# Patient Record
Sex: FEMALE | Race: WHITE | Employment: FULL TIME | URBAN - METROPOLITAN AREA
[De-identification: names, ages, dates, MRNs, and addresses within clinical notes are randomized per-mention and may not be internally consistent; named-entity substitution may affect disease eponyms.]

---

## 2017-05-09 ENCOUNTER — OFFICE VISIT (OUTPATIENT)
Dept: OBGYN CLINIC | Facility: CLINIC | Age: 27
End: 2017-05-09

## 2017-05-09 VITALS
SYSTOLIC BLOOD PRESSURE: 130 MMHG | DIASTOLIC BLOOD PRESSURE: 80 MMHG | HEIGHT: 67.5 IN | WEIGHT: 143.81 LBS | BODY MASS INDEX: 22.31 KG/M2

## 2017-05-09 DIAGNOSIS — Z32.02 PREGNANCY EXAMINATION OR TEST, NEGATIVE RESULT: ICD-10-CM

## 2017-05-09 DIAGNOSIS — Z30.430 ENCOUNTER FOR IUD INSERTION: Primary | ICD-10-CM

## 2017-05-09 PROCEDURE — 81025 URINE PREGNANCY TEST: CPT | Performed by: OBSTETRICS & GYNECOLOGY

## 2017-05-09 PROCEDURE — 58300 INSERT INTRAUTERINE DEVICE: CPT | Performed by: OBSTETRICS & GYNECOLOGY

## 2017-05-09 NOTE — PROGRESS NOTES
GYN H&P     5/9/2017  1:50 PM    CC:Patient presents for IUD placement    HPI: Patient is a 31year old Christophe Buttner Patient's last menstrual period was 04/16/2017 (exact date). who presents for IUD placement.     Menses: 1 x pr month, no heavy bleeding or sever Consent signed. Procedure discussed with the patient in detail including indication, risks, benefits, alternatives and complications. I discussed with the patient the procedure.   I discussed the normal SE of break through vaginal bleeding which could

## 2018-06-01 ENCOUNTER — OFFICE VISIT (OUTPATIENT)
Dept: OTOLARYNGOLOGY | Facility: CLINIC | Age: 28
End: 2018-06-01

## 2018-06-01 VITALS
TEMPERATURE: 98 F | BODY MASS INDEX: 21.98 KG/M2 | DIASTOLIC BLOOD PRESSURE: 81 MMHG | HEIGHT: 68 IN | WEIGHT: 145 LBS | SYSTOLIC BLOOD PRESSURE: 129 MMHG

## 2018-06-01 DIAGNOSIS — S02.2XXA CLOSED FRACTURE OF NASAL BONE, INITIAL ENCOUNTER: Primary | ICD-10-CM

## 2018-06-01 PROCEDURE — 99212 OFFICE O/P EST SF 10 MIN: CPT | Performed by: OTOLARYNGOLOGY

## 2018-06-01 PROCEDURE — 99203 OFFICE O/P NEW LOW 30 MIN: CPT | Performed by: OTOLARYNGOLOGY

## 2018-06-01 RX ORDER — CETIRIZINE HYDROCHLORIDE 10 MG/1
10 TABLET ORAL DAILY
COMMUNITY

## 2018-06-01 RX ORDER — EPINEPHRINE 0.3 MG/.3ML
INJECTION INTRAMUSCULAR
COMMUNITY
Start: 2018-05-16

## 2018-06-01 NOTE — PROGRESS NOTES
Edvin Horton is a 33year old female.   Patient presents with:  Nose Problem: pt bumped nose on Saturday and would like to check if nose is broken, history of broken nose       HISTORY OF PRESENT ILLNESS  Presents with significant history of previous nasal Surgical History:  05/2011: ANESTH,NOSE,SINUS SURGERY  No date: HAND/FINGER SURGERY UNLISTED  No date: JAW SURGERY      Comment: bone issues  No date: OTHER SURGICAL HISTORY      Comment:  For factured nose x2      REVIEW OF SYSTEMS    System Neg/Pos Detail cervical. Posterior cervical. Supraclavicular. Nose/Mouth/Throat Normal External nose -multi deviation to the right. Soft tissue swelling of the nasal dorsum left greater than right. No palpable step-off or suspicious nasal fracture.   Lips/teeth/g

## 2018-08-07 ENCOUNTER — OFFICE VISIT (OUTPATIENT)
Dept: OBGYN CLINIC | Facility: CLINIC | Age: 28
End: 2018-08-07
Payer: COMMERCIAL

## 2018-08-07 VITALS
SYSTOLIC BLOOD PRESSURE: 142 MMHG | HEIGHT: 67.5 IN | BODY MASS INDEX: 24.98 KG/M2 | DIASTOLIC BLOOD PRESSURE: 80 MMHG | WEIGHT: 161 LBS

## 2018-08-07 DIAGNOSIS — Z01.419 ENCOUNTER FOR GYNECOLOGICAL EXAMINATION WITHOUT ABNORMAL FINDING: Primary | ICD-10-CM

## 2018-08-07 PROCEDURE — 99395 PREV VISIT EST AGE 18-39: CPT | Performed by: OBSTETRICS & GYNECOLOGY

## 2018-08-07 NOTE — PROGRESS NOTES
GYN H&P     2018  5:32 PM    CC: Patient is here for annual.     HPI: Patient is a 33year old  for annual. Minimal periods with IUD. No concern for STI's. Patient's last menstrual period was 2018.     OB History    Para Term adenopathy  BREASTS: symmetrical, nontender, no palpable masses or nodes, no nipple discharge, no skin changes, no dippling, no palpable axillary adenopathy  ABDOMEN: Soft, non distended; non tender, no masses. Liver and spleen non-tender, no enlargement.

## 2019-06-19 ENCOUNTER — TELEPHONE (OUTPATIENT)
Dept: OBGYN CLINIC | Facility: CLINIC | Age: 29
End: 2019-06-19

## 2019-08-15 ENCOUNTER — APPOINTMENT (OUTPATIENT)
Dept: LAB | Facility: REFERENCE LAB | Age: 29
End: 2019-08-15
Attending: FAMILY MEDICINE
Payer: COMMERCIAL

## 2019-08-15 ENCOUNTER — OFFICE VISIT (OUTPATIENT)
Dept: FAMILY MEDICINE CLINIC | Facility: CLINIC | Age: 29
End: 2019-08-15
Payer: COMMERCIAL

## 2019-08-15 VITALS
HEIGHT: 67.5 IN | BODY MASS INDEX: 25.87 KG/M2 | SYSTOLIC BLOOD PRESSURE: 129 MMHG | OXYGEN SATURATION: 98 % | HEART RATE: 95 BPM | DIASTOLIC BLOOD PRESSURE: 86 MMHG | WEIGHT: 166.81 LBS

## 2019-08-15 DIAGNOSIS — R21 PAPULAR RASH: ICD-10-CM

## 2019-08-15 DIAGNOSIS — D22.9 NEVUS: ICD-10-CM

## 2019-08-15 DIAGNOSIS — R59.0 LYMPHADENOPATHY OF RIGHT CERVICAL REGION: ICD-10-CM

## 2019-08-15 DIAGNOSIS — L03.316 CELLULITIS OF UMBILICUS: Primary | ICD-10-CM

## 2019-08-15 LAB
BASOPHILS # BLD AUTO: 0.06 X10(3) UL (ref 0–0.2)
BASOPHILS NFR BLD AUTO: 1.1 %
DEPRECATED RDW RBC AUTO: 41.2 FL (ref 35.1–46.3)
EOSINOPHIL # BLD AUTO: 0.12 X10(3) UL (ref 0–0.7)
EOSINOPHIL NFR BLD AUTO: 2.2 %
ERYTHROCYTE [DISTWIDTH] IN BLOOD BY AUTOMATED COUNT: 12.3 % (ref 11–15)
HCT VFR BLD AUTO: 43.2 % (ref 35–48)
HGB BLD-MCNC: 13.9 G/DL (ref 12–16)
IMM GRANULOCYTES # BLD AUTO: 0.01 X10(3) UL (ref 0–1)
IMM GRANULOCYTES NFR BLD: 0.2 %
LYMPHOCYTES # BLD AUTO: 1.43 X10(3) UL (ref 1–4)
LYMPHOCYTES NFR BLD AUTO: 26.7 %
MCH RBC QN AUTO: 29.2 PG (ref 26–34)
MCHC RBC AUTO-ENTMCNC: 32.2 G/DL (ref 31–37)
MCV RBC AUTO: 90.8 FL (ref 80–100)
MONOCYTES # BLD AUTO: 0.49 X10(3) UL (ref 0.1–1)
MONOCYTES NFR BLD AUTO: 9.1 %
NEUTROPHILS # BLD AUTO: 3.25 X10 (3) UL (ref 1.5–7.7)
NEUTROPHILS # BLD AUTO: 3.25 X10(3) UL (ref 1.5–7.7)
NEUTROPHILS NFR BLD AUTO: 60.7 %
PLATELET # BLD AUTO: 271 10(3)UL (ref 150–450)
RBC # BLD AUTO: 4.76 X10(6)UL (ref 3.8–5.3)
WBC # BLD AUTO: 5.4 X10(3) UL (ref 4–11)

## 2019-08-15 PROCEDURE — 85025 COMPLETE CBC W/AUTO DIFF WBC: CPT | Performed by: FAMILY MEDICINE

## 2019-08-15 PROCEDURE — 36415 COLL VENOUS BLD VENIPUNCTURE: CPT | Performed by: FAMILY MEDICINE

## 2019-08-15 PROCEDURE — 99203 OFFICE O/P NEW LOW 30 MIN: CPT | Performed by: FAMILY MEDICINE

## 2019-08-15 RX ORDER — MUPIROCIN CALCIUM 20 MG/G
1 CREAM TOPICAL 3 TIMES DAILY
Qty: 1 TUBE | Refills: 0 | Status: SHIPPED | OUTPATIENT
Start: 2019-08-15 | End: 2019-08-29

## 2019-08-15 NOTE — PROGRESS NOTES
HPI:    Patient ID: Jorge Lopez is a 26year old female who presents for belly button rash, arm rash, mole on right leg, and bump on right neck. HPI   Belly button rash:  Started at least one week ago.    Thinks it might be from swimming a few days chills, fatigue, fever and unexpected weight change. HENT: Negative for congestion, rhinorrhea and sore throat. Respiratory: Negative for cough. Musculoskeletal: Negative for myalgias, joint swelling and joint pain.    Skin: Positive for color simms thigh: Single 3mm diameter brown nevus noted on right anterior thigh. No asymmetry. Distinct borders. Consistent color throughout lesion. Minimally elevated. Psychiatric: She has a normal mood and affect.  Judgment normal.          ASSESSMENT/PLAN:   Nilam

## 2019-10-21 ENCOUNTER — OFFICE VISIT (OUTPATIENT)
Dept: FAMILY MEDICINE CLINIC | Facility: CLINIC | Age: 29
End: 2019-10-21
Payer: COMMERCIAL

## 2019-10-21 VITALS
WEIGHT: 164 LBS | DIASTOLIC BLOOD PRESSURE: 74 MMHG | TEMPERATURE: 99 F | HEIGHT: 67 IN | HEART RATE: 91 BPM | SYSTOLIC BLOOD PRESSURE: 112 MMHG | OXYGEN SATURATION: 99 % | BODY MASS INDEX: 25.74 KG/M2

## 2019-10-21 DIAGNOSIS — H61.002: Primary | ICD-10-CM

## 2019-10-21 DIAGNOSIS — R59.0 LYMPHADENOPATHY OF RIGHT CERVICAL REGION: ICD-10-CM

## 2019-10-21 PROCEDURE — 99213 OFFICE O/P EST LOW 20 MIN: CPT | Performed by: FAMILY MEDICINE

## 2019-10-21 RX ORDER — LEVOFLOXACIN 750 MG/1
750 TABLET ORAL DAILY
Qty: 7 TABLET | Refills: 0 | Status: SHIPPED | OUTPATIENT
Start: 2019-10-21 | End: 2020-05-12

## 2020-05-12 ENCOUNTER — TELEPHONE (OUTPATIENT)
Dept: FAMILY MEDICINE CLINIC | Facility: CLINIC | Age: 30
End: 2020-05-12

## 2020-05-12 RX ORDER — LEVOFLOXACIN 750 MG/1
750 TABLET ORAL DAILY
Qty: 7 TABLET | Refills: 0 | Status: SHIPPED | OUTPATIENT
Start: 2020-05-12 | End: 2020-05-19

## 2020-05-12 NOTE — TELEPHONE ENCOUNTER
Pt states she is having a infection on her left ear in the cartilage area ,would like to discuss with nurse about getting medication

## 2020-05-12 NOTE — TELEPHONE ENCOUNTER
Spoke to patient over phone. She will register for [employer] and send a few pictures. Will discuss further once pictures are sent.

## 2021-07-28 ENCOUNTER — HOSPITAL ENCOUNTER (OUTPATIENT)
Age: 31
Discharge: HOME | End: 2021-07-28
Payer: COMMERCIAL

## 2021-07-28 ENCOUNTER — APPOINTMENT (OUTPATIENT)
Dept: CT IMAGING | Age: 31
End: 2021-07-28
Attending: NURSE PRACTITIONER
Payer: COMMERCIAL

## 2021-07-28 VITALS
HEART RATE: 106 BPM | DIASTOLIC BLOOD PRESSURE: 83 MMHG | OXYGEN SATURATION: 100 % | TEMPERATURE: 97 F | SYSTOLIC BLOOD PRESSURE: 132 MMHG | RESPIRATION RATE: 20 BRPM

## 2021-07-28 VITALS
SYSTOLIC BLOOD PRESSURE: 136 MMHG | TEMPERATURE: 98 F | OXYGEN SATURATION: 97 % | RESPIRATION RATE: 20 BRPM | HEART RATE: 115 BPM | DIASTOLIC BLOOD PRESSURE: 92 MMHG

## 2021-07-28 DIAGNOSIS — K62.89 ANAL OR RECTAL PAIN: Primary | ICD-10-CM

## 2021-07-28 LAB
#MXD IC: 0.6 X10ˆ3/UL (ref 0.1–1)
B-HCG UR QL: NEGATIVE
BILIRUB UR QL STRIP: NEGATIVE
CLARITY UR: CLEAR
COLOR UR: YELLOW
CREAT BLD-MCNC: 0.8 MG/DL
GLUCOSE BLD-MCNC: 92 MG/DL (ref 70–99)
GLUCOSE UR STRIP-MCNC: NEGATIVE MG/DL
HCT VFR BLD AUTO: 43.8 %
HGB BLD-MCNC: 14.2 G/DL
HGB UR QL STRIP: NEGATIVE
ISTAT BUN: 10 MG/DL (ref 7–18)
ISTAT CHLORIDE: 102 MMOL/L (ref 98–112)
ISTAT HEMATOCRIT: 45 %
ISTAT IONIZED CALCIUM FOR CHEM 8: 1.18 MMOL/L (ref 1.12–1.32)
ISTAT POTASSIUM: 3.5 MMOL/L (ref 3.6–5.1)
ISTAT SODIUM: 138 MMOL/L (ref 136–145)
ISTAT TCO2: 24 MMOL/L (ref 21–32)
KETONES UR STRIP-MCNC: 40 MG/DL
LEUKOCYTE ESTERASE UR QL STRIP: NEGATIVE
LYMPHOCYTES # BLD AUTO: 2 X10ˆ3/UL (ref 1–4)
LYMPHOCYTES NFR BLD AUTO: 25.6 %
MCH RBC QN AUTO: 28.9 PG (ref 26–34)
MCHC RBC AUTO-ENTMCNC: 32.4 G/DL (ref 31–37)
MCV RBC AUTO: 89 FL (ref 80–100)
MIXED CELL %: 7.5 %
NEUTROPHILS # BLD AUTO: 5.2 X10ˆ3/UL (ref 1.5–7.7)
NEUTROPHILS NFR BLD AUTO: 66.9 %
NITRITE UR QL STRIP: NEGATIVE
PH UR STRIP: 5.5 [PH]
PLATELET # BLD AUTO: 259 X10ˆ3/UL (ref 150–450)
PROT UR STRIP-MCNC: NEGATIVE MG/DL
RBC # BLD AUTO: 4.92 X10ˆ6/UL
SP GR UR STRIP: 1.02
UROBILINOGEN UR STRIP-ACNC: <2 MG/DL
WBC # BLD AUTO: 7.8 X10ˆ3/UL (ref 4–11)

## 2021-07-28 PROCEDURE — 80047 BASIC METABLC PNL IONIZED CA: CPT

## 2021-07-28 PROCEDURE — 99204 OFFICE O/P NEW MOD 45 MIN: CPT

## 2021-07-28 PROCEDURE — 36415 COLL VENOUS BLD VENIPUNCTURE: CPT

## 2021-07-28 PROCEDURE — 81002 URINALYSIS NONAUTO W/O SCOPE: CPT

## 2021-07-28 PROCEDURE — 85025 COMPLETE CBC W/AUTO DIFF WBC: CPT | Performed by: NURSE PRACTITIONER

## 2021-07-28 PROCEDURE — 81025 URINE PREGNANCY TEST: CPT

## 2021-07-28 PROCEDURE — 99214 OFFICE O/P EST MOD 30 MIN: CPT

## 2021-07-28 PROCEDURE — 72193 CT PELVIS W/DYE: CPT | Performed by: NURSE PRACTITIONER

## 2021-07-28 PROCEDURE — 99203 OFFICE O/P NEW LOW 30 MIN: CPT | Performed by: NURSE PRACTITIONER

## 2021-07-28 RX ORDER — DOCUSATE SODIUM 100 MG/1
100 CAPSULE, LIQUID FILLED ORAL 2 TIMES DAILY
Qty: 20 CAPSULE | Refills: 0 | Status: SHIPPED | OUTPATIENT
Start: 2021-07-28 | End: 2021-08-27

## 2021-07-28 RX ORDER — LIDOCAINE 50 MG/G
1 CREAM RECTAL 2 TIMES DAILY PRN
Qty: 15 G | Refills: 0 | Status: SHIPPED | OUTPATIENT
Start: 2021-07-28

## 2021-07-28 RX ORDER — HYDROCODONE BITARTRATE AND ACETAMINOPHEN 5; 325 MG/1; MG/1
1-2 TABLET ORAL EVERY 6 HOURS PRN
Qty: 10 TABLET | Refills: 0 | Status: SHIPPED | OUTPATIENT
Start: 2021-07-28 | End: 2021-08-04

## 2021-07-28 RX ORDER — MELOXICAM 15 MG/1
TABLET ORAL
COMMUNITY
Start: 2021-07-08

## 2021-07-28 NOTE — ED PROVIDER NOTES
Patient Seen in: [facility]      History   Patient presents with:  Abscess    Stated Complaint: Infection    HPI/Subjective:   HPI    This is a 25-year-old female presenting for rectal pain.   Patient states, she noticed a hair coming from th Effort: Pulmonary effort is normal.      Breath sounds: Normal breath sounds. Abdominal:      Palpations: Abdomen is soft. Tenderness: There is no abdominal tenderness.    Genitourinary:         Comments: External exam no lesions or ulcerations noted diagnosis)     Disposition:  Discharge  7/28/2021  8:39 am    Follow-up:  Ruth Calvo MD  [address] [address]  [phone]    Schedule an appointment as soon as possible for a visit in 1 day      [facility]  Michael SDeanna

## 2021-07-28 NOTE — ED INITIAL ASSESSMENT (HPI)
Patient previously seen at [facility] today. Instructed to come here to r/o perirectal abscess.

## 2021-07-28 NOTE — ED INITIAL ASSESSMENT (HPI)
Pt here with concerns of a possible infected ingrown hair in her mid buttocks, pt states she noticed the pain yesterday, pt states it hurts to sit and its tender to touch, pt denies any fever or chills

## 2021-07-28 NOTE — ED PROVIDER NOTES
Patient Seen in: [facility]      History   Patient presents with:  Abdominal Pain: Went to [facility] immediate care and was told I have possible perirectal abscess and should come to [facility] for pelvic CT and further examination. - Entered by as noted in HPI. Constitutional and vital signs reviewed. All other systems reviewed and negative except as noted above.     Physical Exam     ED Triage Vitals [07/28/21 1536]   BP (!) 121/101   Pulse 115   Resp 20   Temp 97.9 °F (36.6 °C)   Temp src General: No focal deficit present. Mental Status: She is alert and oriented to person, place, and time. Psychiatric:         Mood and Affect: Mood normal.         Behavior: Behavior normal.         Thought Content:  Thought content normal.         Darrin Tucker Dipstick    Collection Time: 07/28/21  4:01 PM   Result Value Ref Range    Urine Color Yellow Yellow    Urine Clarity Clear Clear    Specific Gravity, Urine 1.020 1.005 - 1.030    PH, Urine 5.5 5.0 - 8.0    Protein urine Negative Negative mg/dL    Glucose, of the     vaginal introitus there is a more     complex appearing focus of fluid measuring 12 x 5 mm in which appears to     have a lobulation or internal septation, also most likely representing a     Dima's duct or Bartholin's cyst however correlate consistent w/Dima's/Bartholins cyst; R side is mildly complex; NO pain to this area and findings do not correlate clinically with exam. Patient advised to follow-up primary care. General surgery referral given. RX pain control.  Go to ER for worsening sy

## 2021-07-30 ENCOUNTER — OFFICE VISIT (OUTPATIENT)
Dept: OBGYN CLINIC | Facility: CLINIC | Age: 31
End: 2021-07-30
Payer: COMMERCIAL

## 2021-07-30 VITALS
DIASTOLIC BLOOD PRESSURE: 72 MMHG | BODY MASS INDEX: 26.68 KG/M2 | WEIGHT: 170 LBS | HEIGHT: 67 IN | SYSTOLIC BLOOD PRESSURE: 110 MMHG

## 2021-07-30 DIAGNOSIS — N90.89 VULVAR LESION: Primary | ICD-10-CM

## 2021-07-30 PROCEDURE — 3074F SYST BP LT 130 MM HG: CPT | Performed by: OBSTETRICS & GYNECOLOGY

## 2021-07-30 PROCEDURE — 3078F DIAST BP <80 MM HG: CPT | Performed by: OBSTETRICS & GYNECOLOGY

## 2021-07-30 PROCEDURE — 3008F BODY MASS INDEX DOCD: CPT | Performed by: OBSTETRICS & GYNECOLOGY

## 2021-07-30 PROCEDURE — 99213 OFFICE O/P EST LOW 20 MIN: CPT | Performed by: OBSTETRICS & GYNECOLOGY

## 2021-07-30 NOTE — PROGRESS NOTES
CC: Patient is here for FU of CT scan. HPI: Patient is a 27year old  was seen in [facility] for rectal pain which spontaneously resolved 1 W ago. However, she had CT scan which showed some cysts in perineal area. No pain in that area.  She sometimes can Asked        Back Care: Not Asked        Exercise: Not Asked        Bike Helmet: Not Asked        Seat Belt: Not Asked        Self-Exams: Not Asked    Social History Narrative      Lives with boyfriend    Social Determinants of Health  [employer] S 1.  In the perineum along the lateral margins of the vaginal introitus are 2 cystic foci.  Along the right side there is a 12 x 5 mm cystic focus which could represent a Dima's duct or Bartholin's cyst, however it is mildly complex and could represent

## 2022-02-15 ENCOUNTER — OFFICE VISIT (OUTPATIENT)
Dept: OBGYN CLINIC | Facility: CLINIC | Age: 32
End: 2022-02-15
Payer: COMMERCIAL

## 2022-02-15 VITALS
SYSTOLIC BLOOD PRESSURE: 138 MMHG | WEIGHT: 180 LBS | BODY MASS INDEX: 28.25 KG/M2 | HEIGHT: 67 IN | DIASTOLIC BLOOD PRESSURE: 86 MMHG

## 2022-02-15 DIAGNOSIS — Z01.419 ENCOUNTER FOR GYNECOLOGICAL EXAMINATION WITHOUT ABNORMAL FINDING: Primary | ICD-10-CM

## 2022-02-15 PROCEDURE — 3008F BODY MASS INDEX DOCD: CPT | Performed by: OBSTETRICS & GYNECOLOGY

## 2022-02-15 PROCEDURE — 87624 HPV HI-RISK TYP POOLED RSLT: CPT | Performed by: OBSTETRICS & GYNECOLOGY

## 2022-02-15 PROCEDURE — 3079F DIAST BP 80-89 MM HG: CPT | Performed by: OBSTETRICS & GYNECOLOGY

## 2022-02-15 PROCEDURE — 3075F SYST BP GE 130 - 139MM HG: CPT | Performed by: OBSTETRICS & GYNECOLOGY

## 2022-02-15 PROCEDURE — 99395 PREV VISIT EST AGE 18-39: CPT | Performed by: OBSTETRICS & GYNECOLOGY

## 2022-02-15 RX ORDER — FLUTICASONE PROPIONATE 50 MCG
SPRAY, SUSPENSION (ML) NASAL DAILY
COMMUNITY

## 2022-02-15 RX ORDER — LEVONORGESTREL 52 MG/1
1 INTRAUTERINE DEVICE INTRAUTERINE ONCE
COMMUNITY

## 2022-02-16 LAB — HPV I/H RISK 1 DNA SPEC QL NAA+PROBE: NEGATIVE

## 2023-06-27 ENCOUNTER — WALK IN (OUTPATIENT)
Dept: URGENT CARE | Age: 33
End: 2023-06-27

## 2023-06-27 DIAGNOSIS — B96.89 ACUTE BACTERIAL SINUSITIS: Primary | ICD-10-CM

## 2023-06-27 DIAGNOSIS — J01.90 ACUTE BACTERIAL SINUSITIS: Primary | ICD-10-CM

## 2023-06-27 LAB
INTERNAL PROCEDURAL CONTROLS ACCEPTABLE: YES
S PYO AG THROAT QL IA.RAPID: NEGATIVE
TEST LOT EXPIRATION DATE: NORMAL
TEST LOT NUMBER: NORMAL

## 2023-06-27 PROCEDURE — 99204 OFFICE O/P NEW MOD 45 MIN: CPT | Performed by: NURSE PRACTITIONER

## 2023-06-27 PROCEDURE — 87880 STREP A ASSAY W/OPTIC: CPT | Performed by: NURSE PRACTITIONER

## 2023-06-27 RX ORDER — AZITHROMYCIN 250 MG/1
TABLET, FILM COATED ORAL
Qty: 6 TABLET | Refills: 0 | Status: SHIPPED | OUTPATIENT
Start: 2023-06-27

## 2023-06-27 RX ORDER — APREMILAST 30 MG/1
TABLET, FILM COATED ORAL
COMMUNITY
Start: 2023-06-13

## 2023-06-27 ASSESSMENT — ENCOUNTER SYMPTOMS
PSYCHIATRIC NEGATIVE: 1
NEUROLOGICAL NEGATIVE: 1
RESPIRATORY NEGATIVE: 1
SORE THROAT: 1
EYES NEGATIVE: 1
GASTROINTESTINAL NEGATIVE: 1
CONSTITUTIONAL NEGATIVE: 1
HEMATOLOGIC/LYMPHATIC NEGATIVE: 1
ALLERGIC/IMMUNOLOGIC NEGATIVE: 1

## 2023-07-27 ENCOUNTER — OFFICE VISIT (OUTPATIENT)
Dept: OBGYN CLINIC | Facility: CLINIC | Age: 33
End: 2023-07-27
Payer: COMMERCIAL

## 2023-07-27 VITALS
BODY MASS INDEX: 28.25 KG/M2 | HEIGHT: 67 IN | DIASTOLIC BLOOD PRESSURE: 90 MMHG | WEIGHT: 180 LBS | SYSTOLIC BLOOD PRESSURE: 130 MMHG

## 2023-07-27 DIAGNOSIS — Z01.419 ENCOUNTER FOR GYNECOLOGICAL EXAMINATION WITHOUT ABNORMAL FINDING: Primary | ICD-10-CM

## 2023-07-27 PROCEDURE — 3075F SYST BP GE 130 - 139MM HG: CPT | Performed by: OBSTETRICS & GYNECOLOGY

## 2023-07-27 PROCEDURE — 3008F BODY MASS INDEX DOCD: CPT | Performed by: OBSTETRICS & GYNECOLOGY

## 2023-07-27 PROCEDURE — 99395 PREV VISIT EST AGE 18-39: CPT | Performed by: OBSTETRICS & GYNECOLOGY

## 2023-07-27 PROCEDURE — 3080F DIAST BP >= 90 MM HG: CPT | Performed by: OBSTETRICS & GYNECOLOGY

## 2025-06-18 ENCOUNTER — OFFICE VISIT (OUTPATIENT)
Dept: OBGYN CLINIC | Facility: CLINIC | Age: 35
End: 2025-06-18
Payer: COMMERCIAL

## 2025-06-18 VITALS
WEIGHT: 158.69 LBS | DIASTOLIC BLOOD PRESSURE: 70 MMHG | SYSTOLIC BLOOD PRESSURE: 124 MMHG | BODY MASS INDEX: 24.91 KG/M2 | HEIGHT: 67 IN

## 2025-06-18 DIAGNOSIS — Z01.419 ENCOUNTER FOR GYNECOLOGICAL EXAMINATION WITHOUT ABNORMAL FINDING: Primary | ICD-10-CM

## 2025-06-18 DIAGNOSIS — Z30.433 ENCOUNTER FOR REMOVAL AND REINSERTION OF IUD: ICD-10-CM

## 2025-06-18 PROCEDURE — 58300 INSERT INTRAUTERINE DEVICE: CPT | Performed by: OBSTETRICS & GYNECOLOGY

## 2025-06-18 PROCEDURE — 87624 HPV HI-RISK TYP POOLED RSLT: CPT | Performed by: OBSTETRICS & GYNECOLOGY

## 2025-06-18 PROCEDURE — 3008F BODY MASS INDEX DOCD: CPT | Performed by: OBSTETRICS & GYNECOLOGY

## 2025-06-18 PROCEDURE — 3074F SYST BP LT 130 MM HG: CPT | Performed by: OBSTETRICS & GYNECOLOGY

## 2025-06-18 PROCEDURE — 99395 PREV VISIT EST AGE 18-39: CPT | Performed by: OBSTETRICS & GYNECOLOGY

## 2025-06-18 PROCEDURE — 3078F DIAST BP <80 MM HG: CPT | Performed by: OBSTETRICS & GYNECOLOGY

## 2025-06-18 PROCEDURE — 58301 REMOVE INTRAUTERINE DEVICE: CPT | Performed by: OBSTETRICS & GYNECOLOGY

## 2025-06-18 RX ORDER — APREMILAST 30 MG/1
TABLET, FILM COATED ORAL
COMMUNITY
Start: 2023-06-11

## 2025-06-18 NOTE — PROGRESS NOTES
GYN H&P     2025  2:43 PM    CC: Patient is here for annual and IUD removal and replacement Also needs annual exam.     HPI: Patient is a 35 year old  for above. No periods with Mirena      No LMP recorded. (Menstrual status: IUD - Intrauterine Device).    OB History    Para Term  AB Living   0 0 0 0 0 0   SAB IAB Ectopic Multiple Live Births   0 0 0 0        GYN hx:    Hx Prior Abnormal Pap: No  Pap Date: 02/15/22  Pap Result Notes: wnl        Past Medical History[1]  Past Surgical History[2]  Allergies[3]  Family History[4]  Set as collapsible by default.[5]  Social History     Social History Narrative    Lives with boyfriend    Feels safe       Medications reviewed. See active list.     /70   Ht 67\"   Wt 158 lb 11.2 oz (72 kg)   BMI 24.86 kg/m²       Exam:   GENERAL: well developed, well nourished, in no apparent distress  SKIN: no rashes, no suspicious lesions  HEENT: normal  NECK: supple; no thyroidmegaly, no adenopathy  BREASTS: symmetrical, nontender, no palpable masses or nodes, no nipple discharge, no skin changes, no dippling, no palpable axillary adenopathy  ABDOMEN: Soft, non distended; non tender, no masses.  Liver and spleen non-tender, no enlargement. No palpable hernias  GYNE/:  External Genitalia: Normal appearing, no lesions, normal hair distribution   Urethral meatus appear wnl, no abnormal discharge or lesions noted.   Bladder: well supported, urethra wnl, no palpable tenderness or masses, no discharge  Vagina: normal pink mucosa, no lesions, normal clear discharge.   Uterus: midline, mobile, non-tender, firm and smooth  Cervix: pink, no lesions grossly visible, no discharge  Adnexa: non tender, no palpable masses, normal size  Anus:  No lesions or visible hemorrhoids    IUD Removal     Pregnancy Results:na   Birth control method(s) used:   Consent signed.  Procedure discussed with the patient in detail including indication, risks, benefits, alternatives and  complications.    Pelvic Exam Findings:  EG, vagina and cervix w/o lesions. Uterus NSSC and no adnexal mass / tenderness.         Procedure:  Speculum placed in the vagina.  Betadine wash of vagina and cervix.  An Endocervical speculum was used to visualize strings.  An Allis clamp used to grasp IUD strings.  Mirena IUD was removed without difficulty.  The patient tolerated the procedure well.      IUD Insertion       Consent signed.  Procedure discussed with the patient in detail including indication, risks, benefits, alternatives and complications.          Procedure:  Speculum placed in the vagina.  Betadine wash of vagina and cervix.  Single tooth tenaculum was placed at the 12 o'clock position.  Uterus sounded to 7 cm.  Mirena IUD was placed without difficulty.  Strings cut at 3 cm.  Single tooth tenaculum removed.  Good hemostasis noted.  Patient tolerated procedure well.      Visit Plan:  IUD surveillance was discussed with the patient.        A/P: Patient is 35 year old female     1. Encounter for gynecological examination without abnormal finding  - Urine Preg Test [09226]    2. Encounter for removal and reinsertion of IUD  - Urine Preg Test [63378]  - Levonorgestrel (Mirena) IUD 1 each  - Insert IUD [03471]  - Removal of IUD [87055]      Shawna Doran MD              [1]   Past Medical History:   Asthma (HCC)    Psoriasis   [2]   Past Surgical History:  Procedure Laterality Date    Anesth,nose,sinus surgery  05/2011    Ankle fracture surgery Right 11/2020    Hand/finger surgery unlisted      Jaw surgery      bone issues    Other surgical history      For factured nose x2   [3]   Allergies  Allergen Reactions    Hydrocodone SHORTNESS OF BREATH    Hydrocodone-Acetaminophen SHORTNESS OF BREATH    Tramadol SHORTNESS OF BREATH   [4]   Family History  Problem Relation Age of Onset    Diabetes Father     Other (Bladder Cancer) Maternal Grandmother     Breast Cancer Neg     Colon Cancer Neg      Ovarian Cancer Neg    [5]   Social History  Socioeconomic History    Marital status: Single   Occupational History    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

## (undated) NOTE — LETTER
Tracy Clemente, :5/15/1990    CONSENT FOR PROCEDURE/SEDATION    1. I authorize the performance upon Tracy Clemente  the following: IUD Insertion    2. I authorize Dr. Shawna Doran MD (and whomever is designated as the doctor’s assistant), to perform the above-mentioned procedures.    3. If any unforeseen conditions arise during this procedure calling for additional  procedures, operations, or medications (including anesthesia and blood transfusion), I further request and authorize the doctor to do whatever he/she deems advisable in my interest.    4. I consent to the taking and reproduction of any photographs in the course of this procedure for professional purposes.    5. I consent to the administration of such sedation as may be considered necessary or advisable by the physician responsible for this service, with the exception of ______________________________________________________    6. I have been informed by my doctor of the nature and purpose of this procedure sedation, possible alternative methods of treatment, risk involved and possible complications.      Signature of Patient:_______________________________________________    Signature of person authorized to consent for patient:  _______________________________________________________________    Relationship to patient: ____________________________________________    Witness: _________________________________________ Date:___________     Physician Signature: _______________________________ Date:___________

## (undated) NOTE — MR AVS SNAPSHOT
[address]  at [place] [address]  [phone]               Thank you for choosing us for your health care visit with Nico Solorzano MD.  We are glad to serve you and happy to dayron "[employer]" will allow you to access patient instructions from your recent visit,  view other health information, and more. To sign up or find more information, go to [email or web address] and click on the Sign Up Now link in the [employer] box.      Enter

## (undated) NOTE — Clinical Note
Tracy Clemente, :5/15/1990    CONSENT FOR PROCEDURE/SEDATION    1. I authorize the performance upon Tracy Clemente  the following: Insertion IUD    2.  I authorize Dr. Darling Levine MD (and whomever is designated as the doctor’s assistant), to Witness: _________________________________________ Date:___________     Physician Signature: _______________________________ Date:___________